# Patient Record
(demographics unavailable — no encounter records)

---

## 2024-12-04 NOTE — PLAN
[FreeTextEntry1] : Patient is 2 weeks status post cholecystectomy.  Indication was acute cholecystitis.  Pathology confirms acute cholecystitis as well as chronic cholecystitis.  Patient is no complaints.  She is eating going to bathroom.  On exam her incisions are well-healed.  Plan: Patient avoid heavy lifting for 2 additional weeks.  She will follow me on as-needed basis.